# Patient Record
Sex: MALE | Race: ASIAN | NOT HISPANIC OR LATINO | Employment: UNEMPLOYED | URBAN - METROPOLITAN AREA
[De-identification: names, ages, dates, MRNs, and addresses within clinical notes are randomized per-mention and may not be internally consistent; named-entity substitution may affect disease eponyms.]

---

## 2023-04-07 ENCOUNTER — APPOINTMENT (OUTPATIENT)
Dept: GENERAL RADIOLOGY | Facility: CLINIC | Age: 33
End: 2023-04-07
Attending: EMERGENCY MEDICINE

## 2023-04-07 ENCOUNTER — HOSPITAL ENCOUNTER (EMERGENCY)
Facility: CLINIC | Age: 33
Discharge: HOME | End: 2023-04-08
Attending: EMERGENCY MEDICINE | Admitting: EMERGENCY MEDICINE

## 2023-04-07 DIAGNOSIS — R06.00 DYSPNEA, UNSPECIFIED TYPE: ICD-10-CM

## 2023-04-07 DIAGNOSIS — R07.9 CHEST PAIN, UNSPECIFIED TYPE: ICD-10-CM

## 2023-04-07 LAB
ALBUMIN SERPL BCG-MCNC: 4.6 G/DL (ref 3.5–5.2)
ALP SERPL-CCNC: 61 U/L (ref 40–129)
ALT SERPL W P-5'-P-CCNC: 28 U/L (ref 10–50)
ANION GAP SERPL CALCULATED.3IONS-SCNC: 11 MMOL/L (ref 7–15)
AST SERPL W P-5'-P-CCNC: 26 U/L (ref 10–50)
ATRIAL RATE - MUSE: 63 BPM
BASOPHILS # BLD AUTO: 0.1 10E3/UL (ref 0–0.2)
BASOPHILS NFR BLD AUTO: 1 %
BILIRUB SERPL-MCNC: 0.3 MG/DL
BUN SERPL-MCNC: 23.7 MG/DL (ref 6–20)
CALCIUM SERPL-MCNC: 8.9 MG/DL (ref 8.6–10)
CHLORIDE SERPL-SCNC: 103 MMOL/L (ref 98–107)
CREAT SERPL-MCNC: 0.84 MG/DL (ref 0.67–1.17)
DEPRECATED HCO3 PLAS-SCNC: 24 MMOL/L (ref 22–29)
DIASTOLIC BLOOD PRESSURE - MUSE: NORMAL MMHG
EOSINOPHIL # BLD AUTO: 0.2 10E3/UL (ref 0–0.7)
EOSINOPHIL NFR BLD AUTO: 2 %
ERYTHROCYTE [DISTWIDTH] IN BLOOD BY AUTOMATED COUNT: 12.5 % (ref 10–15)
GFR SERPL CREATININE-BSD FRML MDRD: >90 ML/MIN/1.73M2
GLUCOSE SERPL-MCNC: 113 MG/DL (ref 70–99)
HCT VFR BLD AUTO: 45.4 % (ref 40–53)
HGB BLD-MCNC: 15.4 G/DL (ref 13.3–17.7)
HOLD SPECIMEN: NORMAL
IMM GRANULOCYTES # BLD: 0 10E3/UL
IMM GRANULOCYTES NFR BLD: 0 %
INTERPRETATION ECG - MUSE: NORMAL
LYMPHOCYTES # BLD AUTO: 1.9 10E3/UL (ref 0.8–5.3)
LYMPHOCYTES NFR BLD AUTO: 24 %
MCH RBC QN AUTO: 30.2 PG (ref 26.5–33)
MCHC RBC AUTO-ENTMCNC: 33.9 G/DL (ref 31.5–36.5)
MCV RBC AUTO: 89 FL (ref 78–100)
MONOCYTES # BLD AUTO: 0.4 10E3/UL (ref 0–1.3)
MONOCYTES NFR BLD AUTO: 5 %
NEUTROPHILS # BLD AUTO: 5.4 10E3/UL (ref 1.6–8.3)
NEUTROPHILS NFR BLD AUTO: 68 %
NRBC # BLD AUTO: 0 10E3/UL
NRBC BLD AUTO-RTO: 0 /100
P AXIS - MUSE: 50 DEGREES
PLATELET # BLD AUTO: 230 10E3/UL (ref 150–450)
POTASSIUM SERPL-SCNC: 3.8 MMOL/L (ref 3.4–5.3)
PR INTERVAL - MUSE: 176 MS
PROT SERPL-MCNC: 6.8 G/DL (ref 6.4–8.3)
QRS DURATION - MUSE: 84 MS
QT - MUSE: 400 MS
QTC - MUSE: 409 MS
R AXIS - MUSE: 31 DEGREES
RBC # BLD AUTO: 5.1 10E6/UL (ref 4.4–5.9)
SODIUM SERPL-SCNC: 138 MMOL/L (ref 136–145)
SYSTOLIC BLOOD PRESSURE - MUSE: NORMAL MMHG
T AXIS - MUSE: 29 DEGREES
TROPONIN T SERPL HS-MCNC: 7 NG/L
TROPONIN T SERPL HS-MCNC: 7 NG/L
VENTRICULAR RATE- MUSE: 63 BPM
WBC # BLD AUTO: 8 10E3/UL (ref 4–11)

## 2023-04-07 PROCEDURE — 71046 X-RAY EXAM CHEST 2 VIEWS: CPT

## 2023-04-07 PROCEDURE — 85025 COMPLETE CBC W/AUTO DIFF WBC: CPT | Performed by: EMERGENCY MEDICINE

## 2023-04-07 PROCEDURE — 99285 EMERGENCY DEPT VISIT HI MDM: CPT | Mod: 25

## 2023-04-07 PROCEDURE — 80053 COMPREHEN METABOLIC PANEL: CPT | Performed by: EMERGENCY MEDICINE

## 2023-04-07 PROCEDURE — 36415 COLL VENOUS BLD VENIPUNCTURE: CPT | Performed by: EMERGENCY MEDICINE

## 2023-04-07 PROCEDURE — 93005 ELECTROCARDIOGRAM TRACING: CPT

## 2023-04-07 PROCEDURE — 84484 ASSAY OF TROPONIN QUANT: CPT | Mod: 91 | Performed by: EMERGENCY MEDICINE

## 2023-04-07 PROCEDURE — 84484 ASSAY OF TROPONIN QUANT: CPT | Performed by: EMERGENCY MEDICINE

## 2023-04-07 ASSESSMENT — ACTIVITIES OF DAILY LIVING (ADL)
ADLS_ACUITY_SCORE: 35
ADLS_ACUITY_SCORE: 35

## 2023-04-07 ASSESSMENT — ENCOUNTER SYMPTOMS: SHORTNESS OF BREATH: 1

## 2023-04-08 VITALS
OXYGEN SATURATION: 99 % | SYSTOLIC BLOOD PRESSURE: 132 MMHG | DIASTOLIC BLOOD PRESSURE: 68 MMHG | RESPIRATION RATE: 18 BRPM | TEMPERATURE: 98.9 F | HEART RATE: 61 BPM

## 2023-04-08 NOTE — ED NOTES
Bed: FT03  Expected date:   Expected time:   Means of arrival:   Comments:  HEMS [age]M chest pain- in PD custody

## 2023-04-08 NOTE — ED PROVIDER NOTES
History     Chief Complaint:  Chest Pain     The history is provided by the patient. A  was used (Mandarin).     Chasidy Interiano is a 32 year old male who presents with chest pain. Triage note states that he was in the back of a  car being arrested on a felony charge for stealing from [employer]. He reports that all of a sudden he had shortness of breath in the back of the  car. He states that when the window was rolled down and he felt fresh air that it improved his symptoms. He reports that this has never happened before. He states that his chest pain has improved since the initial experience of chest pain. He reports that he felt like his heart rhythm was not regular and that his breathing was not smooth.     Independent Historian:   None - Patient Only    Review of External Notes:   None available    ROS:  Review of Systems   Respiratory: Positive for shortness of breath.    Cardiovascular: Positive for chest pain.   All other systems reviewed and are negative.    Allergies:  No known drug allergies      Medications:    The patient is not currently taking any prescribed medications.    Past Medical History:    No pertinent past medical history    Social History:  Patient arrives via ambulance alone    Physical Exam     Patient Vitals for the past 24 hrs:   BP Temp Pulse Resp SpO2   04/07/23 2301 (!) 146/85 -- 63 16 99 %   04/07/23 2231 (!) 149/89 -- 60 16 99 %   04/07/23 2206 (!) 148/87 -- 52 14 99 %   04/07/23 2116 (!) 151/89 -- 75 16 99 %   04/07/23 2055 (!) 142/92 98.9  F (37.2  C) 60 18 98 %      Physical Exam  General: Alert, No distress. Nontoxic appearance  Head: No signs of trauma.   Mouth/Throat: Oropharynx moist.   Eyes: Conjunctivae are normal. Pupils are equal..   Neck: Normal range of motion.    CV: Appears well perfused.  Resp:No respiratory distress.   MSK: Normal range of motion. No obvious deformity.   Neuro: The patient is alert and interactive. LAUREANO. Speech normal. GCS  15  Skin: No lesion or sign of trauma noted.   Psych: normal mood and affect. behavior is normal.     Emergency Department Course   ECG  ECG results from 04/07/23   EKG 12-lead, tracing only     Value    Systolic Blood Pressure     Diastolic Blood Pressure     Ventricular Rate 63    Atrial Rate 63    GA Interval 176    QRS Duration 84        QTc 409    P Axis 50    R AXIS 31    T Axis 29    Interpretation ECG      Sinus rhythm  Normal ECG  No previous ECGs available  Confirmed by GENERATED REPORT, COMPUTER ([id number]),  Yasmany Saul ([id number]) on 4/7/2023 8:57:37 PM       Imaging:  XR Chest 2 Views   Final Result   IMPRESSION: Negative chest.         Report per radiology    Laboratory:  Labs Ordered and Resulted from Time of ED Arrival to Time of ED Departure   COMPREHENSIVE METABOLIC PANEL - Abnormal       Result Value    Sodium 138      Potassium 3.8      Chloride 103      Carbon Dioxide (CO2) 24      Anion Gap 11      Urea Nitrogen 23.7 (*)     Creatinine 0.84      Calcium 8.9      Glucose 113 (*)     Alkaline Phosphatase 61      AST 26      ALT 28      Protein Total 6.8      Albumin 4.6      Bilirubin Total 0.3      GFR Estimate >90     TROPONIN T, HIGH SENSITIVITY - Normal    Troponin T, High Sensitivity 7     TROPONIN T, HIGH SENSITIVITY - Normal    Troponin T, High Sensitivity 7     CBC WITH PLATELETS AND DIFFERENTIAL    WBC Count 8.0      RBC Count 5.10      Hemoglobin 15.4      Hematocrit 45.4      MCV 89      MCH 30.2      MCHC 33.9      RDW 12.5      Platelet Count 230      % Neutrophils 68      % Lymphocytes 24      % Monocytes 5      % Eosinophils 2      % Basophils 1      % Immature Granulocytes 0      NRBCs per 100 WBC 0      Absolute Neutrophils 5.4      Absolute Lymphocytes 1.9      Absolute Monocytes 0.4      Absolute Eosinophils 0.2      Absolute Basophils 0.1      Absolute Immature Granulocytes 0.0      Absolute NRBCs 0.0        Emergency Department Course & Assessments:      Interventions:  Medications - No data to display     Independent Interpretation (X-rays, CTs, rhythm strip):  Chest x-ray was normal with no evidence of pneumothorax    Assessments/Consultations/Discussion of Management or Tests:  ED Course as of 04/07/23 2347 Fri Apr 07, 2023 2126 I obtained history and examined the patient as noted above.      Social Determinants of Health affecting care:   Healthcare Access/Compliance, Education/Literacy, Legal Problems/Incarceration and Transportation    Disposition:  The patient was discharged to home.     Impression & Plan    Medical Decision Making:  Chasidy Interiano is a 32 year old male presented to the Emergency Department with a complaint of chest pain and shortness of breath. Fortunately the workup in the ED has been unremarkable and at this time I am not concerned for ACS. The EKG shows no acute ischemic changes. The troponin is negative x 2. Based on the [facility] high sensitivity troponin pathway, this should rule the patient out for myocardial injury    I considered other possible causes of chest pain including PE, infection, pneumothorax, aortic dissection, and even more benign causes such as reflux and esophageal motility issues. The physical exam, laboratory, and radiological findings listed above make life threatening conditions less likely. At this time I believe the patient is safe for discharge. I have encouraged close outpatient follow up.     Anticipatory guidance given prior to discharge.    Diagnosis:    ICD-10-CM    1. Chest pain, unspecified type  R07.9       2. Dyspnea, unspecified type  R06.00          Scribe Disclosure:  I, Polina Parrakrzysztof, am serving as a scribe at 11:31 PM on 4/7/2023 to document services personally performed by Biju Mcclain MD based on my observations and the provider's statements to me.      4/7/2023   Biju Mcclain MD Joing, Todd Roger, MD  04/08/23 0003

## 2023-04-08 NOTE — ED TRIAGE NOTES
Pt is in police custody, was in the back of the police car when he started complaining of chest pain. Pt is primarily mandarin speaking. He requested medical attention. He states he has chest tightness in the epigastric region and when that happens, he becomes dizzy and has a hard time taking a deep breath

## 2023-04-08 NOTE — ED NOTES
Carlos from [place] police arrived with the patient. He was informed that the room is not secure and we have no reason to keep him on a hold. The  stated that he is unable to stay and requested that we call when he is up for discharge. The officer was informed that if he tries to leave we cannot make him stay, the officer was okay with that. Number to call on discharge is [phone]